# Patient Record
Sex: FEMALE | Race: WHITE | NOT HISPANIC OR LATINO | Employment: OTHER | URBAN - METROPOLITAN AREA
[De-identification: names, ages, dates, MRNs, and addresses within clinical notes are randomized per-mention and may not be internally consistent; named-entity substitution may affect disease eponyms.]

---

## 2020-12-10 ENCOUNTER — OFFICE VISIT (OUTPATIENT)
Dept: PODIATRY | Facility: CLINIC | Age: 70
End: 2020-12-10
Payer: MEDICARE

## 2020-12-10 VITALS
BODY MASS INDEX: 31.89 KG/M2 | WEIGHT: 180 LBS | DIASTOLIC BLOOD PRESSURE: 85 MMHG | HEIGHT: 63 IN | SYSTOLIC BLOOD PRESSURE: 175 MMHG | HEART RATE: 70 BPM | TEMPERATURE: 98 F

## 2020-12-10 DIAGNOSIS — M76.61 ACHILLES TENDINITIS OF RIGHT LOWER EXTREMITY: Primary | ICD-10-CM

## 2020-12-10 DIAGNOSIS — M20.12 VALGUS DEFORMITY OF BOTH GREAT TOES: ICD-10-CM

## 2020-12-10 DIAGNOSIS — M20.41 HAMMER TOES OF BOTH FEET: ICD-10-CM

## 2020-12-10 DIAGNOSIS — M20.42 HAMMER TOES OF BOTH FEET: ICD-10-CM

## 2020-12-10 DIAGNOSIS — M20.11 VALGUS DEFORMITY OF BOTH GREAT TOES: ICD-10-CM

## 2020-12-10 PROCEDURE — 99999 PR PBB SHADOW E&M-NEW PATIENT-LVL III: ICD-10-PCS | Mod: PBBFAC,,, | Performed by: PODIATRIST

## 2020-12-10 PROCEDURE — 99203 OFFICE O/P NEW LOW 30 MIN: CPT | Mod: S$GLB,,, | Performed by: PODIATRIST

## 2020-12-10 PROCEDURE — 99999 PR PBB SHADOW E&M-NEW PATIENT-LVL III: CPT | Mod: PBBFAC,,, | Performed by: PODIATRIST

## 2020-12-10 PROCEDURE — 99203 OFFICE O/P NEW LOW 30 MIN: CPT | Mod: PBBFAC,PN | Performed by: PODIATRIST

## 2020-12-10 PROCEDURE — 99203 PR OFFICE/OUTPT VISIT, NEW, LEVL III, 30-44 MIN: ICD-10-PCS | Mod: S$GLB,,, | Performed by: PODIATRIST

## 2020-12-10 RX ORDER — ROPINIROLE 1 MG/1
1 TABLET, FILM COATED ORAL 3 TIMES DAILY
COMMUNITY
End: 2021-03-17 | Stop reason: SDUPTHER

## 2020-12-10 RX ORDER — NAPROXEN SODIUM 220 MG
220 TABLET ORAL
COMMUNITY

## 2020-12-10 RX ORDER — GLUCOSAMINE HCL 500 MG
1000 TABLET ORAL
COMMUNITY

## 2020-12-10 RX ORDER — CITALOPRAM 40 MG/1
40 TABLET, FILM COATED ORAL
COMMUNITY

## 2020-12-10 RX ORDER — LEVOTHYROXINE SODIUM 100 UG/1
100 TABLET ORAL
COMMUNITY
End: 2021-03-17 | Stop reason: SDUPTHER

## 2020-12-10 RX ORDER — MELOXICAM 15 MG/1
15 TABLET ORAL DAILY
COMMUNITY
End: 2021-03-17 | Stop reason: SDUPTHER

## 2020-12-10 RX ORDER — GABAPENTIN 400 MG/1
400 CAPSULE ORAL
COMMUNITY

## 2020-12-10 NOTE — LETTER
December 13, 2020      Marcelina Weber, ROYA  [address] [facility] [place] [address]           [facility] - Podiatry/Wound Care  [facility] [facility] [address]  Phone: [phone]  Fax: [phone]          Patient: Tawny Olguin   MR Number: [id number]   YOB: 1950   Date of Visit: 12/10/2020       Dear Marcelina Weber:    Thank you for referring Tawny Olguin to me for evaluation. Attached you will find relevant portions of my assessment and plan of care.    If you have questions, please do not hesitate to call me. I look forward to following Tawny Olguin along with you.    Sincerely,    Yinka Shin, MIGUELITO    Enclosure  CC:  No Recipients    If you would like to receive this communication electronically, please contact [email or web address] or [phone] to request more information on [email or web address] Link access.    For providers and/or their staff who would like to refer a patient to [facility], please contact us through our one-stop-shop provider referral line, [facility], at [phone].    If you feel you have received this communication in error or would no longer like to receive these types of communications, please e-mail [email or web address]

## 2020-12-13 NOTE — PROGRESS NOTES
Subjective:       Patient ID: Tawny Olguin is a 70 y.o. female.    Chief Complaint: Follow-up, Foot Pain, Foot Problem, and Callouses   Patient presents today with multiple complaints she is complaining of bunions on both feet as well as hammertoes and mom areas of callus formation she states that she has lost about 80 lb in since she has lost weight her feet have actually started to bother her more her right foot is more bothersome than the left she is also having a stabbing type pain over the bunion and she is having pain at the back of her right heel more so than the left.  Patient states this has gotten worse over the past 6-7 months.    Past Medical History:   Diagnosis Date    Arthritis     Hypertension     Hypothyroidism      Past Surgical History:   Procedure Laterality Date    gastric sleeve      2019    HYSTERECTOMY      STOMACH SURGERY      STOMACH SURGERY      TOTAL KNEE REPLACEMENT USING COMPUTER NAVIGATION      right      Family History   Problem Relation Age of Onset    Diabetes Mother     Diabetes Sister     Diabetes Brother     Diabetes Daughter      Social History     Socioeconomic History    Marital status:      Spouse name: Not on file    Number of children: Not on file    Years of education: Not on file    Highest education level: Not on file   Occupational History    Not on file   Social Needs    Financial resource strain: Not on file    Food insecurity     Worry: Not on file     Inability: Not on file    Transportation needs     Medical: Not on file     Non-medical: Not on file   Tobacco Use    Smoking status: Former Smoker     Types: Cigarettes    Smokeless tobacco: Never Used   Substance and Sexual Activity    Alcohol use: Not Currently    Drug use: Never    Sexual activity: Not Currently   Lifestyle    Physical activity     Days per week: Not on file     Minutes per session: Not on file    Stress: Not on file   Relationships    Social connections     Talks  "on phone: Not on file     Gets together: Not on file     Attends Gnosticist service: Not on file     Active member of club or organization: Not on file     Attends meetings of clubs or organizations: Not on file     Relationship status: Not on file   Other Topics Concern    Not on file   Social History Narrative    Not on file       Current Outpatient Medications   Medication Sig Dispense Refill    cholecalciferol, vitamin D3, 75 mcg (3,000 unit) Tab Take 1,000 Units by mouth.      citalopram (CELEXA) 40 MG tablet Take 40 mg by mouth.      gabapentin (NEURONTIN) 400 MG capsule Take 400 mg by mouth.      levothyroxine (SYNTHROID) 100 MCG tablet Take 100 mcg by mouth.      meloxicam (MOBIC) 15 MG tablet Take 15 mg by mouth once daily.      naproxen sodium (ANAPROX) 220 MG tablet Take 220 mg by mouth.      rOPINIRole (REQUIP) 1 MG tablet Take 1 mg by mouth 3 (three) times daily.       No current facility-administered medications for this visit.      Review of patient's allergies indicates:   Allergen Reactions    Vicodin [hydrocodone-acetaminophen]        Review of Systems   Musculoskeletal: Positive for arthralgias and joint swelling.   All other systems reviewed and are negative.      Objective:      Vitals:    12/10/20 1025   BP: (!) 175/85   Pulse: 70   Temp: 98 °F (36.7 °C)   Weight: 81.6 kg (180 lb)   Height: 5' 3" (1.6 m)     Physical Exam  Vitals signs and nursing note reviewed.   Constitutional:       Appearance: Normal appearance.   Cardiovascular:      Pulses:           Dorsalis pedis pulses are 2+ on the right side and 2+ on the left side.        Posterior tibial pulses are 2+ on the right side and 2+ on the left side.   Pulmonary:      Effort: Pulmonary effort is normal.   Musculoskeletal:         General: Swelling, tenderness and deformity present.      Right foot: Decreased range of motion. Deformity and bunion present.      Left foot: Decreased range of motion. Deformity and bunion present.     "    Feet:    Feet:      Right foot:      Protective Sensation: 2 sites tested. 2 sites sensed.      Skin integrity: Erythema and warmth present.      Left foot:      Protective Sensation: 2 sites tested. 2 sites sensed.      Skin integrity: Erythema and warmth present.   Skin:     General: Skin is warm.      Capillary Refill: Capillary refill takes less than 2 seconds.      Findings: Erythema present.   Neurological:      General: No focal deficit present.      Mental Status: She is alert.   Psychiatric:         Mood and Affect: Mood normal.         Behavior: Behavior normal.         Thought Content: Thought content normal.         Judgment: Judgment normal.              Assessment:       1. Achilles tendinitis of right lower extremity    2. Valgus deformity of both great toes    3. Hammer toes of both feet        Plan:        Patient presents today with multiple complaints she is complaining of bunions on both feet as well as hammertoes and mom areas of callus formation she states that she has lost about 80 lb in since she has lost weight her feet have actually started to bother her more her right foot is more bothersome than the left she is also having a stabbing type pain over the bunion and she is having pain at the back of her right heel more so than the left.  Patient states this has gotten worse over the past 6-7 months.  Following extensive evaluation patient has significant bunion deformities bilateral right greater than left she also has severely contracted digits consistent with hammertoe deformity bilateral.  Patient has palpable spurring at the posterior aspect of the calcaneus and Achilles tendon insertion bilateral the right is definitely worse than the left I have ordered plain film x-rays bilateral to evaluate the bunion hammertoe deformities as well as the spurring of the calcaneus patient is going to have these done prior to her next visit in 1 week as of the time of dictation the x-rays had not  been done yet so I will discuss these in review these with her at her follow-up appointment her left leg is slightly shorter than the right she does wear little bit of a heel lift in the left side I did dispense soft blue heel lifts for the patient to try to take some of the pressure off of the Achilles tendon insertion bilateral and the spurring this is to try to get her some relief until I follow up with her in review her x-rays and provide surgical consultation.  Follow-up 1 week face-to-face time equaled 30 min.This note was created using [employer] voice recognition software that occasionally misinterpreted phrases or words.

## 2020-12-15 ENCOUNTER — HOSPITAL ENCOUNTER (OUTPATIENT)
Dept: RADIOLOGY | Facility: HOSPITAL | Age: 70
Discharge: HOME | End: 2020-12-15
Attending: PODIATRIST
Payer: MEDICARE

## 2020-12-15 DIAGNOSIS — M20.12 VALGUS DEFORMITY OF BOTH GREAT TOES: ICD-10-CM

## 2020-12-15 DIAGNOSIS — M20.41 HAMMER TOES OF BOTH FEET: ICD-10-CM

## 2020-12-15 DIAGNOSIS — M20.11 VALGUS DEFORMITY OF BOTH GREAT TOES: ICD-10-CM

## 2020-12-15 DIAGNOSIS — M20.42 HAMMER TOES OF BOTH FEET: ICD-10-CM

## 2020-12-15 PROCEDURE — 73630 XR FOOT COMPLETE 3 VIEW BILATERAL: ICD-10-PCS | Mod: 26,50,, | Performed by: RADIOLOGY

## 2020-12-15 PROCEDURE — 73630 X-RAY EXAM OF FOOT: CPT | Mod: TC,50,PN

## 2020-12-15 PROCEDURE — 73630 X-RAY EXAM OF FOOT: CPT | Mod: 26,50,, | Performed by: RADIOLOGY

## 2020-12-17 ENCOUNTER — OFFICE VISIT (OUTPATIENT)
Dept: PODIATRY | Facility: CLINIC | Age: 70
End: 2020-12-17
Payer: MEDICAID

## 2020-12-17 VITALS
SYSTOLIC BLOOD PRESSURE: 146 MMHG | DIASTOLIC BLOOD PRESSURE: 78 MMHG | TEMPERATURE: 98 F | HEART RATE: 76 BPM | HEIGHT: 63 IN | BODY MASS INDEX: 31.89 KG/M2 | WEIGHT: 180 LBS

## 2020-12-17 DIAGNOSIS — M77.31 CALCANEAL SPUR OF RIGHT FOOT: ICD-10-CM

## 2020-12-17 DIAGNOSIS — S86.011D RUPTURE OF RIGHT ACHILLES TENDON, SUBSEQUENT ENCOUNTER: Primary | ICD-10-CM

## 2020-12-17 DIAGNOSIS — M20.11 HALLUX VALGUS OF RIGHT FOOT: ICD-10-CM

## 2020-12-17 DIAGNOSIS — M20.41 HAMMER TOE OF RIGHT FOOT: ICD-10-CM

## 2020-12-17 PROBLEM — S86.011A ACHILLES RUPTURE, RIGHT: Status: ACTIVE | Noted: 2020-12-17

## 2020-12-17 PROCEDURE — 99215 OFFICE O/P EST HI 40 MIN: CPT | Mod: S$GLB,,, | Performed by: PODIATRIST

## 2020-12-17 PROCEDURE — 99999 PR PBB SHADOW E&M-EST. PATIENT-LVL IV: ICD-10-PCS | Mod: PBBFAC,,, | Performed by: PODIATRIST

## 2020-12-17 PROCEDURE — 99214 OFFICE O/P EST MOD 30 MIN: CPT | Mod: PBBFAC,PN | Performed by: PODIATRIST

## 2020-12-17 PROCEDURE — 99215 PR OFFICE/OUTPT VISIT, EST, LEVL V, 40-54 MIN: ICD-10-PCS | Mod: S$GLB,,, | Performed by: PODIATRIST

## 2020-12-17 PROCEDURE — 99999 PR PBB SHADOW E&M-EST. PATIENT-LVL IV: CPT | Mod: PBBFAC,,, | Performed by: PODIATRIST

## 2020-12-17 RX ORDER — ESCITALOPRAM OXALATE 20 MG/1
TABLET ORAL
COMMUNITY
Start: 2020-12-14

## 2020-12-17 RX ORDER — MONTELUKAST SODIUM 10 MG/1
TABLET ORAL
COMMUNITY
Start: 2020-12-14 | End: 2021-03-17 | Stop reason: SDUPTHER

## 2020-12-20 NOTE — PROGRESS NOTES
Subjective:       Patient ID: Shyanne Olguin is a 70 y.o. female.    Chief Complaint: Follow-up, Foot Pain, Heel Pain, and Foot Problem   Patient presents today with multiple complaints she is complaining of bunions on both feet as well as hammertoes and multiple areas of callus formation she states that she has lost about 80 lb in since she has lost weight her feet have actually started to bother her more her right foot is more bothersome than the left she is also having a stabbing type pain over the bunion and she is having pain at the back of her right heel more so than the left.  Patient states this has gotten worse over the past 6-7 months.    Past Medical History:   Diagnosis Date    Arthritis     Hypertension     Hypothyroidism      Past Surgical History:   Procedure Laterality Date    gastric sleeve      2019    HYSTERECTOMY      STOMACH SURGERY      STOMACH SURGERY      TOTAL KNEE REPLACEMENT USING COMPUTER NAVIGATION      right      Family History   Problem Relation Age of Onset    Diabetes Mother     Diabetes Sister     Diabetes Brother     Diabetes Daughter      Social History     Socioeconomic History    Marital status:      Spouse name: Not on file    Number of children: Not on file    Years of education: Not on file    Highest education level: Not on file   Occupational History    Not on file   Social Needs    Financial resource strain: Not on file    Food insecurity     Worry: Not on file     Inability: Not on file    Transportation needs     Medical: Not on file     Non-medical: Not on file   Tobacco Use    Smoking status: Former Smoker     Types: Cigarettes    Smokeless tobacco: Never Used   Substance and Sexual Activity    Alcohol use: Not Currently    Drug use: Never    Sexual activity: Not Currently   Lifestyle    Physical activity     Days per week: Not on file     Minutes per session: Not on file    Stress: Not on file   Relationships    Social connections      "Talks on phone: Not on file     Gets together: Not on file     Attends Confucianist service: Not on file     Active member of club or organization: Not on file     Attends meetings of clubs or organizations: Not on file     Relationship status: Not on file   Other Topics Concern    Not on file   Social History Narrative    Not on file       Current Outpatient Medications   Medication Sig Dispense Refill    cholecalciferol, vitamin D3, 75 mcg (3,000 unit) Tab Take 1,000 Units by mouth.      citalopram (CELEXA) 40 MG tablet Take 40 mg by mouth.      escitalopram oxalate (LEXAPRO) 20 MG tablet       gabapentin (NEURONTIN) 400 MG capsule Take 400 mg by mouth.      levothyroxine (SYNTHROID) 100 MCG tablet Take 100 mcg by mouth.      meloxicam (MOBIC) 15 MG tablet Take 15 mg by mouth once daily.      montelukast (SINGULAIR) 10 mg tablet       naproxen sodium (ANAPROX) 220 MG tablet Take 220 mg by mouth.      rOPINIRole (REQUIP) 1 MG tablet Take 1 mg by mouth 3 (three) times daily.       No current facility-administered medications for this visit.      Review of patient's allergies indicates:   Allergen Reactions    Hydrocodone-acetaminophen      "causes severe Headaches"       Review of Systems   Musculoskeletal: Positive for arthralgias and joint swelling.   All other systems reviewed and are negative.      Objective:      Vitals:    12/17/20 1310   BP: (!) 146/78   Pulse: 76   Temp: 98.1 °F (36.7 °C)   Weight: 81.6 kg (180 lb)   Height: 5' 3" (1.6 m)     Physical Exam  Vitals signs and nursing note reviewed.   Constitutional:       Appearance: Normal appearance.   Cardiovascular:      Pulses:           Dorsalis pedis pulses are 2+ on the right side and 2+ on the left side.        Posterior tibial pulses are 2+ on the right side and 2+ on the left side.   Pulmonary:      Effort: Pulmonary effort is normal.   Musculoskeletal:         General: Swelling, tenderness and deformity present.      Right foot: Decreased " range of motion. Deformity and bunion present.      Left foot: Decreased range of motion. Deformity and bunion present.        Feet:    Feet:      Right foot:      Protective Sensation: 2 sites tested. 2 sites sensed.      Skin integrity: Erythema and warmth present.      Left foot:      Protective Sensation: 2 sites tested. 2 sites sensed.      Skin integrity: Erythema and warmth present.   Skin:     General: Skin is warm.      Capillary Refill: Capillary refill takes less than 2 seconds.      Findings: Erythema present.   Neurological:      General: No focal deficit present.      Mental Status: She is alert.   Psychiatric:         Mood and Affect: Mood normal.         Behavior: Behavior normal.         Thought Content: Thought content normal.         Judgment: Judgment normal.              Assessment:       1. Rupture of right Achilles tendon, subsequent encounter    2. Calcaneal spur of right foot    3. Hallux valgus of right foot    4. Hammer toe of right foot        Plan:        Patient presents today with multiple complaints she is complaining of bunions on both feet as well as hammertoes and multiple areas of callus formation she states that she has lost about 80 lb in since she has lost weight her feet have actually started to bother her more her right foot is more bothersome than the left she is also having a stabbing type pain over the bunion and she is having pain at the back of her right heel more so than the left.  Patient states this has gotten worse over the past 6-7 months.  Patient's x-rays were evaluated and discussed at length and in detail with her today patient has extensive spurring at the posterior calcaneus right at the attachment site of the Achilles tendon there is likely some degree of Achilles tendon tearing at this site with this extensive spurring patient has a lot of pain inflammation discomfort in this area it does not appear that there is a complete rupture of the Achilles as it does  appear to still be attached but has to be assumed that there is some rupturing of the Achilles tendon do the extensive spurring as display to the patient on x-ray patient does not have this posterior calcaneal spurring on the left foot.  I also reviewed and discussed the x-rays for the patient's bunion deformities bilateral patient has a considerable bunion deformity bilateral right greater than left patient indicates she would like to proceed with surgery to address the bunion deformity it has become very difficult to wear any type of closed in shoe and she is having almost constant pain that is gotten progressively worse over this area.  Additionally the patient has a significant digital contracture of the 2nd digit I would recommend correcting this also I discussed a closing base wedge osteotomy with distal Akin procedure as needed to address the bunion deformity an arthrodesis of the 2nd digit right patient had inquired about possibly fixing her Achilles tendon at the same time I did advised her this can be done it would entail her being nonweightbearing for a period of 6 weeks following surgery for the Achilles tendon to properly heal but we can fix the Achilles tendon remove the spurs correct the bunion and correct the contracted 2nd digit on the right foot all at the same time so that she will be healing from all of these procedures at the same time.  Patient indicated she would like to go ahead and pursue this surgery as discussed today we have tentatively schedule her for the middle of January however the patient does need to get cardiac clearance from her cardiologist Dr. Byrd 1st.  Patient was given a prescription pad indicating that she needs cardiac clearance to take to Dr. Byrd.  Patient states she has recently had a cardiac workup including a stress test but I have advised her doctor lip these may want to see her before he clears her for surgery she was in understanding and agreement with this today  I will see her for further preoperative evaluation and surgical consultation.  Total face-to-face time including discussion evaluation discussion of treatment plan surgery involved length of recovery and review of x-rays equaled 60 min.  This note was created using [employer] voice recognition software that occasionally misinterpreted phrases or words.

## 2021-01-11 ENCOUNTER — OFFICE VISIT (OUTPATIENT)
Dept: PODIATRY | Facility: CLINIC | Age: 71
End: 2021-01-11
Payer: MEDICARE

## 2021-01-11 VITALS
HEIGHT: 63 IN | WEIGHT: 180 LBS | SYSTOLIC BLOOD PRESSURE: 144 MMHG | HEART RATE: 64 BPM | DIASTOLIC BLOOD PRESSURE: 69 MMHG | TEMPERATURE: 98 F | OXYGEN SATURATION: 98 % | BODY MASS INDEX: 31.89 KG/M2

## 2021-01-11 DIAGNOSIS — Z01.818 PRE-OP TESTING: ICD-10-CM

## 2021-01-11 DIAGNOSIS — M77.31 CALCANEAL SPUR OF RIGHT FOOT: ICD-10-CM

## 2021-01-11 DIAGNOSIS — M20.41 HAMMER TOE OF RIGHT FOOT: ICD-10-CM

## 2021-01-11 DIAGNOSIS — Z01.818 PRE-OP EXAM: ICD-10-CM

## 2021-01-11 DIAGNOSIS — M20.11 HALLUX VALGUS OF RIGHT FOOT: ICD-10-CM

## 2021-01-11 DIAGNOSIS — S86.011D RUPTURE OF RIGHT ACHILLES TENDON, SUBSEQUENT ENCOUNTER: Primary | ICD-10-CM

## 2021-01-11 PROCEDURE — 99999 PR PBB SHADOW E&M-EST. PATIENT-LVL V: CPT | Mod: PBBFAC,,, | Performed by: PODIATRIST

## 2021-01-11 PROCEDURE — 99999 PR PBB SHADOW E&M-EST. PATIENT-LVL V: ICD-10-PCS | Mod: PBBFAC,,, | Performed by: PODIATRIST

## 2021-01-11 PROCEDURE — 99215 OFFICE O/P EST HI 40 MIN: CPT | Mod: S$GLB,,, | Performed by: PODIATRIST

## 2021-01-11 PROCEDURE — 99215 PR OFFICE/OUTPT VISIT, EST, LEVL V, 40-54 MIN: ICD-10-PCS | Mod: S$GLB,,, | Performed by: PODIATRIST

## 2021-01-11 RX ORDER — SULFAMETHOXAZOLE AND TRIMETHOPRIM 400; 80 MG/1; MG/1
2 TABLET ORAL 2 TIMES DAILY
Qty: 56 TABLET | Refills: 0 | Status: SHIPPED | OUTPATIENT
Start: 2021-01-11 | End: 2021-01-25

## 2021-01-11 RX ORDER — ONDANSETRON HYDROCHLORIDE 8 MG/1
8 TABLET, FILM COATED ORAL EVERY 8 HOURS PRN
Qty: 42 TABLET | Refills: 1 | Status: SHIPPED | OUTPATIENT
Start: 2021-01-11 | End: 2021-01-25

## 2021-01-11 RX ORDER — OXYCODONE AND ACETAMINOPHEN 10; 325 MG/1; MG/1
1 TABLET ORAL
Qty: 30 TABLET | Refills: 0 | Status: SHIPPED | OUTPATIENT
Start: 2021-01-11 | End: 2021-01-16

## 2021-01-12 ENCOUNTER — HOSPITAL ENCOUNTER (OUTPATIENT)
Dept: PREADMISSION TESTING | Facility: HOSPITAL | Age: 71
Discharge: HOME | End: 2021-01-12
Attending: PODIATRIST
Payer: MEDICARE

## 2021-01-12 ENCOUNTER — ANESTHESIA EVENT (OUTPATIENT)
Dept: SURGERY | Facility: HOSPITAL | Age: 71
End: 2021-01-12
Payer: MEDICARE

## 2021-01-12 VITALS — HEIGHT: 63 IN | WEIGHT: 180 LBS | BODY MASS INDEX: 31.89 KG/M2

## 2021-01-12 DIAGNOSIS — S86.011D RUPTURE OF RIGHT ACHILLES TENDON, SUBSEQUENT ENCOUNTER: Primary | ICD-10-CM

## 2021-01-12 PROCEDURE — 93010 ELECTROCARDIOGRAM REPORT: CPT | Mod: ,,, | Performed by: INTERNAL MEDICINE

## 2021-01-12 PROCEDURE — 93005 ELECTROCARDIOGRAM TRACING: CPT

## 2021-01-12 PROCEDURE — U0003 INFECTIOUS AGENT DETECTION BY NUCLEIC ACID (DNA OR RNA); SEVERE ACUTE RESPIRATORY SYNDROME CORONAVIRUS 2 (SARS-COV-2) (CORONAVIRUS DISEASE [COVID-19]), AMPLIFIED PROBE TECHNIQUE, MAKING USE OF HIGH THROUGHPUT TECHNOLOGIES AS DESCRIBED BY CMS-2020-01-R: HCPCS

## 2021-01-12 PROCEDURE — 99900103 DSU ONLY-NO CHARGE-INITIAL HR (STAT)

## 2021-01-12 PROCEDURE — 93010 EKG 12-LEAD: ICD-10-PCS | Mod: ,,, | Performed by: INTERNAL MEDICINE

## 2021-01-12 RX ORDER — MORPHINE SULFATE 4 MG/ML
2 INJECTION, SOLUTION INTRAMUSCULAR; INTRAVENOUS EVERY 5 MIN PRN
Status: CANCELLED | OUTPATIENT
Start: 2021-01-12

## 2021-01-12 RX ORDER — DIPHENHYDRAMINE HYDROCHLORIDE 50 MG/ML
12.5 INJECTION INTRAMUSCULAR; INTRAVENOUS
Status: CANCELLED | OUTPATIENT
Start: 2021-01-12

## 2021-01-12 RX ORDER — SODIUM CHLORIDE, SODIUM LACTATE, POTASSIUM CHLORIDE, CALCIUM CHLORIDE 600; 310; 30; 20 MG/100ML; MG/100ML; MG/100ML; MG/100ML
125 INJECTION, SOLUTION INTRAVENOUS CONTINUOUS
Status: CANCELLED | OUTPATIENT
Start: 2021-01-12

## 2021-01-12 RX ORDER — ONDANSETRON 2 MG/ML
4 INJECTION INTRAMUSCULAR; INTRAVENOUS DAILY PRN
Status: CANCELLED | OUTPATIENT
Start: 2021-01-12

## 2021-01-13 LAB — SARS-COV-2 RNA RESP QL NAA+PROBE: NOT DETECTED

## 2021-01-13 RX ORDER — SODIUM CHLORIDE, SODIUM LACTATE, POTASSIUM CHLORIDE, CALCIUM CHLORIDE 600; 310; 30; 20 MG/100ML; MG/100ML; MG/100ML; MG/100ML
INJECTION, SOLUTION INTRAVENOUS CONTINUOUS
Status: CANCELLED | OUTPATIENT
Start: 2021-01-15

## 2021-01-15 ENCOUNTER — ANESTHESIA (OUTPATIENT)
Dept: SURGERY | Facility: HOSPITAL | Age: 71
End: 2021-01-15
Payer: MEDICARE

## 2021-01-15 ENCOUNTER — HOSPITAL ENCOUNTER (OUTPATIENT)
Facility: HOSPITAL | Age: 71
Discharge: HOME | End: 2021-01-15
Attending: PODIATRIST | Admitting: PODIATRIST
Payer: MEDICARE

## 2021-01-15 VITALS
WEIGHT: 180 LBS | HEART RATE: 86 BPM | TEMPERATURE: 98 F | BODY MASS INDEX: 31.89 KG/M2 | HEIGHT: 63 IN | OXYGEN SATURATION: 97 % | RESPIRATION RATE: 18 BRPM | SYSTOLIC BLOOD PRESSURE: 124 MMHG | DIASTOLIC BLOOD PRESSURE: 67 MMHG

## 2021-01-15 DIAGNOSIS — M20.11 HALLUX VALGUS OF RIGHT FOOT: ICD-10-CM

## 2021-01-15 DIAGNOSIS — M20.41 HAMMER TOES OF BOTH FEET: ICD-10-CM

## 2021-01-15 DIAGNOSIS — S86.011D RUPTURE OF RIGHT ACHILLES TENDON, SUBSEQUENT ENCOUNTER: Primary | ICD-10-CM

## 2021-01-15 DIAGNOSIS — M77.31 CALCANEAL SPUR OF RIGHT FOOT: ICD-10-CM

## 2021-01-15 DIAGNOSIS — M20.41 HAMMER TOE OF RIGHT FOOT: ICD-10-CM

## 2021-01-15 DIAGNOSIS — Z01.818 PRE-OP EXAM: ICD-10-CM

## 2021-01-15 DIAGNOSIS — M20.42 HAMMER TOES OF BOTH FEET: ICD-10-CM

## 2021-01-15 PROCEDURE — C1769 GUIDE WIRE: HCPCS | Performed by: PODIATRIST

## 2021-01-15 PROCEDURE — C1713 ANCHOR/SCREW BN/BN,TIS/BN: HCPCS | Performed by: PODIATRIST

## 2021-01-15 PROCEDURE — 28120 PART REMOVAL OF ANKLE/HEEL: CPT | Mod: 51,RT,, | Performed by: PODIATRIST

## 2021-01-15 PROCEDURE — 63600175 PHARM REV CODE 636 W HCPCS: Performed by: PODIATRIST

## 2021-01-15 PROCEDURE — 28120 PR PART REMV TALUS OR CALCANEUS: ICD-10-PCS | Mod: 51,RT,, | Performed by: PODIATRIST

## 2021-01-15 PROCEDURE — 71000015 HC POSTOP RECOV 1ST HR: Performed by: PODIATRIST

## 2021-01-15 PROCEDURE — 27654 PR REPAIR ACHILLES TENDON,SECONDARY: ICD-10-PCS | Mod: 51,RT,, | Performed by: PODIATRIST

## 2021-01-15 PROCEDURE — D9220A PRA ANESTHESIA: Mod: ANES,,, | Performed by: ANESTHESIOLOGY

## 2021-01-15 PROCEDURE — 28299 PR CORRECT BUNION,DOUBLE OSTEOTOMY: ICD-10-PCS | Mod: T5,,, | Performed by: PODIATRIST

## 2021-01-15 PROCEDURE — 37000009 HC ANESTHESIA EA ADD 15 MINS: Performed by: PODIATRIST

## 2021-01-15 PROCEDURE — 01480 ANES OPEN PX LOWER L/A/F NOS: CPT | Performed by: PODIATRIST

## 2021-01-15 PROCEDURE — 63600175 PHARM REV CODE 636 W HCPCS: Performed by: NURSE ANESTHETIST, CERTIFIED REGISTERED

## 2021-01-15 PROCEDURE — D9220A PRA ANESTHESIA: ICD-10-PCS | Mod: CRNA,,, | Performed by: NURSE ANESTHETIST, CERTIFIED REGISTERED

## 2021-01-15 PROCEDURE — 28285 REPAIR OF HAMMERTOE: CPT | Mod: 59,T6,, | Performed by: PODIATRIST

## 2021-01-15 PROCEDURE — 71000033 HC RECOVERY, INTIAL HOUR: Performed by: PODIATRIST

## 2021-01-15 PROCEDURE — 63600175 PHARM REV CODE 636 W HCPCS: Performed by: ANESTHESIOLOGY

## 2021-01-15 PROCEDURE — 25000003 PHARM REV CODE 250

## 2021-01-15 PROCEDURE — 37000008 HC ANESTHESIA 1ST 15 MINUTES: Performed by: PODIATRIST

## 2021-01-15 PROCEDURE — 25000003 PHARM REV CODE 250: Performed by: PODIATRIST

## 2021-01-15 PROCEDURE — D9220A PRA ANESTHESIA: Mod: CRNA,,, | Performed by: NURSE ANESTHETIST, CERTIFIED REGISTERED

## 2021-01-15 PROCEDURE — 27654 REPAIR OF ACHILLES TENDON: CPT | Mod: 51,RT,, | Performed by: PODIATRIST

## 2021-01-15 PROCEDURE — D9220A PRA ANESTHESIA: ICD-10-PCS | Mod: ANES,,, | Performed by: ANESTHESIOLOGY

## 2021-01-15 PROCEDURE — 36000708 HC OR TIME LEV III 1ST 15 MIN: Performed by: PODIATRIST

## 2021-01-15 PROCEDURE — 27201423 OPTIME MED/SURG SUP & DEVICES STERILE SUPPLY: Performed by: PODIATRIST

## 2021-01-15 PROCEDURE — 25000003 PHARM REV CODE 250: Performed by: NURSE ANESTHETIST, CERTIFIED REGISTERED

## 2021-01-15 PROCEDURE — 28299 COR HLX VLGS DOUBLE OSTEOT: CPT | Mod: T5,,, | Performed by: PODIATRIST

## 2021-01-15 PROCEDURE — 36000709 HC OR TIME LEV III EA ADD 15 MIN: Performed by: PODIATRIST

## 2021-01-15 PROCEDURE — 28285 PR REPAIR OF HAMMERTOE,ONE: ICD-10-PCS | Mod: 59,T6,, | Performed by: PODIATRIST

## 2021-01-15 DEVICE — IMPLANTABLE DEVICE: Type: IMPLANTABLE DEVICE | Site: FOOT | Status: FUNCTIONAL

## 2021-01-15 DEVICE — IMPLANT SYS, BIOC ACHILLES SPEEDB W/JUMP
Type: IMPLANTABLE DEVICE | Site: FOOT | Status: FUNCTIONAL
Brand: ARTHREX®

## 2021-01-15 RX ORDER — CEFAZOLIN SODIUM 2 G/50ML
SOLUTION INTRAVENOUS
Status: COMPLETED
Start: 2021-01-15 | End: 2021-01-15

## 2021-01-15 RX ORDER — SODIUM CHLORIDE, SODIUM LACTATE, POTASSIUM CHLORIDE, CALCIUM CHLORIDE 600; 310; 30; 20 MG/100ML; MG/100ML; MG/100ML; MG/100ML
INJECTION, SOLUTION INTRAVENOUS CONTINUOUS
Status: DISCONTINUED | OUTPATIENT
Start: 2021-01-15 | End: 2021-01-15 | Stop reason: HOSPADM

## 2021-01-15 RX ORDER — LIDOCAINE HYDROCHLORIDE 10 MG/ML
1 INJECTION, SOLUTION EPIDURAL; INFILTRATION; INTRACAUDAL; PERINEURAL ONCE
Status: DISCONTINUED | OUTPATIENT
Start: 2021-01-15 | End: 2021-01-15 | Stop reason: HOSPADM

## 2021-01-15 RX ORDER — BUPIVACAINE HYDROCHLORIDE 5 MG/ML
INJECTION, SOLUTION PERINEURAL
Status: DISCONTINUED | OUTPATIENT
Start: 2021-01-15 | End: 2021-01-15 | Stop reason: HOSPADM

## 2021-01-15 RX ORDER — ROCURONIUM BROMIDE 10 MG/ML
INJECTION, SOLUTION INTRAVENOUS
Status: DISCONTINUED | OUTPATIENT
Start: 2021-01-15 | End: 2021-01-15

## 2021-01-15 RX ORDER — CEFAZOLIN SODIUM 2 G/50ML
2 SOLUTION INTRAVENOUS
Status: COMPLETED | OUTPATIENT
Start: 2021-01-15 | End: 2021-01-15

## 2021-01-15 RX ORDER — LIDOCAINE HYDROCHLORIDE 10 MG/ML
INJECTION INFILTRATION; PERINEURAL
Status: DISCONTINUED | OUTPATIENT
Start: 2021-01-15 | End: 2021-01-15 | Stop reason: HOSPADM

## 2021-01-15 RX ORDER — PROPOFOL 10 MG/ML
VIAL (ML) INTRAVENOUS
Status: DISCONTINUED | OUTPATIENT
Start: 2021-01-15 | End: 2021-01-15

## 2021-01-15 RX ORDER — FAMOTIDINE 10 MG/ML
INJECTION INTRAVENOUS
Status: COMPLETED
Start: 2021-01-15 | End: 2021-01-15

## 2021-01-15 RX ORDER — SUCCINYLCHOLINE CHLORIDE 20 MG/ML
INJECTION INTRAMUSCULAR; INTRAVENOUS
Status: DISCONTINUED | OUTPATIENT
Start: 2021-01-15 | End: 2021-01-15

## 2021-01-15 RX ORDER — MIDAZOLAM HYDROCHLORIDE 1 MG/ML
INJECTION INTRAMUSCULAR; INTRAVENOUS
Status: DISCONTINUED | OUTPATIENT
Start: 2021-01-15 | End: 2021-01-15

## 2021-01-15 RX ORDER — FAMOTIDINE 10 MG/ML
20 INJECTION INTRAVENOUS ONCE
Status: COMPLETED | OUTPATIENT
Start: 2021-01-15 | End: 2021-01-15

## 2021-01-15 RX ORDER — EPHEDRINE SULFATE 50 MG/ML
INJECTION, SOLUTION INTRAVENOUS
Status: DISCONTINUED | OUTPATIENT
Start: 2021-01-15 | End: 2021-01-15

## 2021-01-15 RX ORDER — BUPIVACAINE HYDROCHLORIDE 5 MG/ML
INJECTION, SOLUTION EPIDURAL; INTRACAUDAL
Status: DISCONTINUED
Start: 2021-01-15 | End: 2021-01-15 | Stop reason: HOSPADM

## 2021-01-15 RX ORDER — LIDOCAINE HYDROCHLORIDE 20 MG/ML
INJECTION, SOLUTION EPIDURAL; INFILTRATION; INTRACAUDAL; PERINEURAL
Status: DISCONTINUED | OUTPATIENT
Start: 2021-01-15 | End: 2021-01-15

## 2021-01-15 RX ORDER — ONDANSETRON 2 MG/ML
INJECTION INTRAMUSCULAR; INTRAVENOUS
Status: DISCONTINUED | OUTPATIENT
Start: 2021-01-15 | End: 2021-01-15

## 2021-01-15 RX ORDER — MEPERIDINE HYDROCHLORIDE 50 MG/ML
INJECTION INTRAMUSCULAR; INTRAVENOUS; SUBCUTANEOUS
Status: DISCONTINUED | OUTPATIENT
Start: 2021-01-15 | End: 2021-01-15

## 2021-01-15 RX ADMIN — EPHEDRINE SULFATE 15 MG: 50 INJECTION INTRAVENOUS at 10:01

## 2021-01-15 RX ADMIN — ONDANSETRON 4 MG: 2 INJECTION INTRAMUSCULAR; INTRAVENOUS at 07:01

## 2021-01-15 RX ADMIN — SUGAMMADEX 200 MG: 100 INJECTION, SOLUTION INTRAVENOUS at 10:01

## 2021-01-15 RX ADMIN — SODIUM CHLORIDE, POTASSIUM CHLORIDE, SODIUM LACTATE AND CALCIUM CHLORIDE: 600; 310; 30; 20 INJECTION, SOLUTION INTRAVENOUS at 07:01

## 2021-01-15 RX ADMIN — MEPERIDINE HYDROCHLORIDE 50 MG: 50 INJECTION INTRAMUSCULAR; INTRAVENOUS; SUBCUTANEOUS at 07:01

## 2021-01-15 RX ADMIN — SUCCINYLCHOLINE CHLORIDE 150 MG: 20 INJECTION, SOLUTION INTRAMUSCULAR; INTRAVENOUS at 07:01

## 2021-01-15 RX ADMIN — MIDAZOLAM HYDROCHLORIDE 2 MG: 1 INJECTION, SOLUTION INTRAMUSCULAR; INTRAVENOUS at 07:01

## 2021-01-15 RX ADMIN — ROCURONIUM BROMIDE 25 MG: 10 INJECTION, SOLUTION INTRAVENOUS at 07:01

## 2021-01-15 RX ADMIN — ROCURONIUM BROMIDE 25 MG: 10 INJECTION, SOLUTION INTRAVENOUS at 09:01

## 2021-01-15 RX ADMIN — SODIUM CHLORIDE, POTASSIUM CHLORIDE, SODIUM LACTATE AND CALCIUM CHLORIDE: 600; 310; 30; 20 INJECTION, SOLUTION INTRAVENOUS at 09:01

## 2021-01-15 RX ADMIN — ROCURONIUM BROMIDE 25 MG: 10 INJECTION, SOLUTION INTRAVENOUS at 08:01

## 2021-01-15 RX ADMIN — PROPOFOL 200 MG: 10 INJECTION, EMULSION INTRAVENOUS at 07:01

## 2021-01-15 RX ADMIN — CEFAZOLIN SODIUM 2000 MG: 2 SOLUTION INTRAVENOUS at 07:01

## 2021-01-15 RX ADMIN — EPHEDRINE SULFATE 10 MG: 50 INJECTION INTRAVENOUS at 09:01

## 2021-01-15 RX ADMIN — LIDOCAINE HYDROCHLORIDE 50 MG: 20 INJECTION, SOLUTION EPIDURAL; INFILTRATION; INTRACAUDAL; PERINEURAL at 07:01

## 2021-01-15 RX ADMIN — FAMOTIDINE 20 MG: 10 INJECTION INTRAVENOUS at 07:01

## 2021-01-15 RX ADMIN — EPHEDRINE SULFATE 25 MG: 50 INJECTION INTRAVENOUS at 07:01

## 2021-01-20 ENCOUNTER — OFFICE VISIT (OUTPATIENT)
Dept: PODIATRY | Facility: CLINIC | Age: 71
End: 2021-01-20
Payer: MEDICARE

## 2021-01-20 VITALS
TEMPERATURE: 98 F | RESPIRATION RATE: 15 BRPM | SYSTOLIC BLOOD PRESSURE: 118 MMHG | HEART RATE: 79 BPM | DIASTOLIC BLOOD PRESSURE: 70 MMHG | HEIGHT: 63 IN | OXYGEN SATURATION: 99 % | BODY MASS INDEX: 31.89 KG/M2 | WEIGHT: 180 LBS

## 2021-01-20 DIAGNOSIS — M20.11 HALLUX VALGUS OF RIGHT FOOT: Primary | ICD-10-CM

## 2021-01-20 DIAGNOSIS — M20.41 HAMMER TOE OF RIGHT FOOT: ICD-10-CM

## 2021-01-20 PROCEDURE — 99024 PR POST-OP FOLLOW-UP VISIT: ICD-10-PCS | Mod: S$GLB,,, | Performed by: PODIATRIST

## 2021-01-20 PROCEDURE — 99999 PR PBB SHADOW E&M-EST. PATIENT-LVL IV: ICD-10-PCS | Mod: PBBFAC,,, | Performed by: PODIATRIST

## 2021-01-20 PROCEDURE — 99999 PR PBB SHADOW E&M-EST. PATIENT-LVL IV: CPT | Mod: PBBFAC,,, | Performed by: PODIATRIST

## 2021-01-20 PROCEDURE — 99024 POSTOP FOLLOW-UP VISIT: CPT | Mod: S$GLB,,, | Performed by: PODIATRIST

## 2021-02-03 ENCOUNTER — HOSPITAL ENCOUNTER (OUTPATIENT)
Dept: RADIOLOGY | Facility: HOSPITAL | Age: 71
Discharge: HOME | End: 2021-02-03
Attending: PODIATRIST
Payer: MEDICARE

## 2021-02-03 ENCOUNTER — OFFICE VISIT (OUTPATIENT)
Dept: PODIATRY | Facility: CLINIC | Age: 71
End: 2021-02-03
Payer: MEDICARE

## 2021-02-03 VITALS
HEART RATE: 77 BPM | TEMPERATURE: 98 F | DIASTOLIC BLOOD PRESSURE: 67 MMHG | HEIGHT: 63 IN | WEIGHT: 180 LBS | BODY MASS INDEX: 31.89 KG/M2 | SYSTOLIC BLOOD PRESSURE: 96 MMHG

## 2021-02-03 DIAGNOSIS — S86.011D RUPTURE OF RIGHT ACHILLES TENDON, SUBSEQUENT ENCOUNTER: ICD-10-CM

## 2021-02-03 DIAGNOSIS — M20.41 HAMMER TOE OF RIGHT FOOT: ICD-10-CM

## 2021-02-03 DIAGNOSIS — M20.11 HALLUX VALGUS OF RIGHT FOOT: Primary | ICD-10-CM

## 2021-02-03 DIAGNOSIS — M20.11 HALLUX VALGUS OF RIGHT FOOT: ICD-10-CM

## 2021-02-03 PROCEDURE — 99999 PR PBB SHADOW E&M-EST. PATIENT-LVL IV: CPT | Mod: PBBFAC,,, | Performed by: PODIATRIST

## 2021-02-03 PROCEDURE — 73630 X-RAY EXAM OF FOOT: CPT | Mod: 26,RT,, | Performed by: RADIOLOGY

## 2021-02-03 PROCEDURE — 73630 X-RAY EXAM OF FOOT: CPT | Mod: TC,FY,RT

## 2021-02-03 PROCEDURE — 73630 XR FOOT COMPLETE 3 VIEW RIGHT: ICD-10-PCS | Mod: 26,RT,, | Performed by: RADIOLOGY

## 2021-02-03 PROCEDURE — 29405 APPL SHORT LEG CAST: CPT | Mod: 58,RT,S$GLB, | Performed by: PODIATRIST

## 2021-02-03 PROCEDURE — 99024 PR POST-OP FOLLOW-UP VISIT: ICD-10-PCS | Mod: S$GLB,,, | Performed by: PODIATRIST

## 2021-02-03 PROCEDURE — 99999 PR PBB SHADOW E&M-EST. PATIENT-LVL IV: ICD-10-PCS | Mod: PBBFAC,,, | Performed by: PODIATRIST

## 2021-02-03 PROCEDURE — 99024 POSTOP FOLLOW-UP VISIT: CPT | Mod: S$GLB,,, | Performed by: PODIATRIST

## 2021-02-03 PROCEDURE — 29405 PR APPLY SHORT LEG CAST: ICD-10-PCS | Mod: 58,RT,S$GLB, | Performed by: PODIATRIST

## 2021-02-11 ENCOUNTER — TELEPHONE (OUTPATIENT)
Dept: PODIATRY | Facility: CLINIC | Age: 71
End: 2021-02-11

## 2021-02-12 ENCOUNTER — TELEPHONE (OUTPATIENT)
Dept: PODIATRY | Facility: CLINIC | Age: 71
End: 2021-02-12

## 2021-02-12 ENCOUNTER — OFFICE VISIT (OUTPATIENT)
Dept: PODIATRY | Facility: CLINIC | Age: 71
End: 2021-02-12
Payer: MEDICARE

## 2021-02-12 VITALS
HEIGHT: 63 IN | RESPIRATION RATE: 18 BRPM | OXYGEN SATURATION: 99 % | TEMPERATURE: 98 F | SYSTOLIC BLOOD PRESSURE: 126 MMHG | DIASTOLIC BLOOD PRESSURE: 69 MMHG | BODY MASS INDEX: 31.89 KG/M2 | HEART RATE: 71 BPM | WEIGHT: 180 LBS

## 2021-02-12 DIAGNOSIS — M20.11 HALLUX VALGUS OF RIGHT FOOT: Primary | ICD-10-CM

## 2021-02-12 DIAGNOSIS — S86.011D RUPTURE OF RIGHT ACHILLES TENDON, SUBSEQUENT ENCOUNTER: ICD-10-CM

## 2021-02-12 PROCEDURE — 29405 APPL SHORT LEG CAST: CPT | Mod: 58,RT,S$GLB, | Performed by: PODIATRIST

## 2021-02-12 PROCEDURE — 99999 PR PBB SHADOW E&M-EST. PATIENT-LVL IV: CPT | Mod: PBBFAC,,, | Performed by: PODIATRIST

## 2021-02-12 PROCEDURE — 99999 PR PBB SHADOW E&M-EST. PATIENT-LVL IV: ICD-10-PCS | Mod: PBBFAC,,, | Performed by: PODIATRIST

## 2021-02-12 PROCEDURE — 29405 PR APPLY SHORT LEG CAST: ICD-10-PCS | Mod: 58,RT,S$GLB, | Performed by: PODIATRIST

## 2021-02-12 PROCEDURE — 99024 PR POST-OP FOLLOW-UP VISIT: ICD-10-PCS | Mod: S$GLB,,, | Performed by: PODIATRIST

## 2021-02-12 PROCEDURE — 99024 POSTOP FOLLOW-UP VISIT: CPT | Mod: S$GLB,,, | Performed by: PODIATRIST

## 2021-02-15 ENCOUNTER — TELEPHONE (OUTPATIENT)
Dept: PODIATRY | Facility: CLINIC | Age: 71
End: 2021-02-15

## 2021-03-03 ENCOUNTER — OFFICE VISIT (OUTPATIENT)
Dept: PODIATRY | Facility: CLINIC | Age: 71
End: 2021-03-03
Payer: MEDICARE

## 2021-03-03 ENCOUNTER — HOSPITAL ENCOUNTER (OUTPATIENT)
Dept: RADIOLOGY | Facility: HOSPITAL | Age: 71
Discharge: HOME | End: 2021-03-03
Attending: PODIATRIST
Payer: MEDICARE

## 2021-03-03 DIAGNOSIS — M77.31 CALCANEAL SPUR OF RIGHT FOOT: ICD-10-CM

## 2021-03-03 DIAGNOSIS — M20.11 HALLUX VALGUS OF RIGHT FOOT: ICD-10-CM

## 2021-03-03 DIAGNOSIS — M20.11 HALLUX VALGUS OF RIGHT FOOT: Primary | ICD-10-CM

## 2021-03-03 PROCEDURE — 73630 X-RAY EXAM OF FOOT: CPT | Mod: TC,FY,RT

## 2021-03-03 PROCEDURE — 73630 X-RAY EXAM OF FOOT: CPT | Mod: 26,RT,, | Performed by: RADIOLOGY

## 2021-03-03 PROCEDURE — 99999 PR PBB SHADOW E&M-EST. PATIENT-LVL II: ICD-10-PCS | Mod: PBBFAC,,, | Performed by: PODIATRIST

## 2021-03-03 PROCEDURE — 99024 PR POST-OP FOLLOW-UP VISIT: ICD-10-PCS | Mod: S$GLB,,, | Performed by: PODIATRIST

## 2021-03-03 PROCEDURE — 99024 POSTOP FOLLOW-UP VISIT: CPT | Mod: S$GLB,,, | Performed by: PODIATRIST

## 2021-03-03 PROCEDURE — 73630 XR FOOT COMPLETE 3 VIEW RIGHT: ICD-10-PCS | Mod: 26,RT,, | Performed by: RADIOLOGY

## 2021-03-03 PROCEDURE — 99999 PR PBB SHADOW E&M-EST. PATIENT-LVL II: CPT | Mod: PBBFAC,,, | Performed by: PODIATRIST

## 2021-03-17 ENCOUNTER — OFFICE VISIT (OUTPATIENT)
Dept: PODIATRY | Facility: CLINIC | Age: 71
End: 2021-03-17
Payer: MEDICARE

## 2021-03-17 VITALS
DIASTOLIC BLOOD PRESSURE: 87 MMHG | TEMPERATURE: 97 F | WEIGHT: 180 LBS | SYSTOLIC BLOOD PRESSURE: 147 MMHG | HEART RATE: 74 BPM | HEIGHT: 63 IN | BODY MASS INDEX: 31.89 KG/M2

## 2021-03-17 DIAGNOSIS — S86.011D RUPTURE OF RIGHT ACHILLES TENDON, SUBSEQUENT ENCOUNTER: ICD-10-CM

## 2021-03-17 DIAGNOSIS — M20.11 HALLUX VALGUS OF RIGHT FOOT: Primary | ICD-10-CM

## 2021-03-17 PROCEDURE — 99024 PR POST-OP FOLLOW-UP VISIT: ICD-10-PCS | Mod: S$GLB,,, | Performed by: PODIATRIST

## 2021-03-17 PROCEDURE — 99999 PR PBB SHADOW E&M-EST. PATIENT-LVL III: ICD-10-PCS | Mod: PBBFAC,,, | Performed by: PODIATRIST

## 2021-03-17 PROCEDURE — 99999 PR PBB SHADOW E&M-EST. PATIENT-LVL III: CPT | Mod: PBBFAC,,, | Performed by: PODIATRIST

## 2021-03-17 PROCEDURE — 99024 POSTOP FOLLOW-UP VISIT: CPT | Mod: S$GLB,,, | Performed by: PODIATRIST

## 2021-03-17 RX ORDER — ROPINIROLE 2 MG/1
TABLET, FILM COATED ORAL
COMMUNITY
Start: 2021-03-05

## 2021-03-17 RX ORDER — LEVOTHYROXINE SODIUM 75 UG/1
75 TABLET ORAL DAILY
COMMUNITY
Start: 2021-03-05

## 2021-03-17 RX ORDER — MELOXICAM 15 MG/1
TABLET ORAL
COMMUNITY
Start: 2021-03-05

## 2021-03-17 RX ORDER — MONTELUKAST SODIUM 10 MG/1
TABLET ORAL
COMMUNITY
Start: 2021-03-05

## 2021-03-30 ENCOUNTER — IMMUNIZATION (OUTPATIENT)
Dept: FAMILY MEDICINE | Facility: CLINIC | Age: 71
End: 2021-03-30
Payer: MEDICARE

## 2021-03-30 DIAGNOSIS — Z23 NEED FOR VACCINATION: Primary | ICD-10-CM

## 2021-03-30 PROCEDURE — 91301 COVID-19, MRNA, LNP-S, PF, 100 MCG/0.5 ML DOSE VACCINE: ICD-10-PCS | Mod: S$GLB,,, | Performed by: FAMILY MEDICINE

## 2021-03-30 PROCEDURE — 91301 COVID-19, MRNA, LNP-S, PF, 100 MCG/0.5 ML DOSE VACCINE: CPT | Mod: S$GLB,,, | Performed by: FAMILY MEDICINE

## 2021-03-30 PROCEDURE — 0011A COVID-19, MRNA, LNP-S, PF, 100 MCG/0.5 ML DOSE VACCINE: ICD-10-PCS | Mod: CV19,S$GLB,, | Performed by: FAMILY MEDICINE

## 2021-03-30 PROCEDURE — 0011A COVID-19, MRNA, LNP-S, PF, 100 MCG/0.5 ML DOSE VACCINE: CPT | Mod: CV19,S$GLB,, | Performed by: FAMILY MEDICINE

## 2021-03-31 ENCOUNTER — OFFICE VISIT (OUTPATIENT)
Dept: PODIATRY | Facility: CLINIC | Age: 71
End: 2021-03-31
Payer: MEDICARE

## 2021-03-31 VITALS
HEART RATE: 68 BPM | BODY MASS INDEX: 31.89 KG/M2 | SYSTOLIC BLOOD PRESSURE: 159 MMHG | DIASTOLIC BLOOD PRESSURE: 75 MMHG | TEMPERATURE: 97 F | WEIGHT: 180 LBS | HEIGHT: 63 IN

## 2021-03-31 DIAGNOSIS — S86.011D RUPTURE OF RIGHT ACHILLES TENDON, SUBSEQUENT ENCOUNTER: ICD-10-CM

## 2021-03-31 DIAGNOSIS — M20.11 HALLUX VALGUS OF RIGHT FOOT: Primary | ICD-10-CM

## 2021-03-31 PROCEDURE — 99024 POSTOP FOLLOW-UP VISIT: CPT | Mod: S$GLB,,, | Performed by: PODIATRIST

## 2021-03-31 PROCEDURE — 99024 PR POST-OP FOLLOW-UP VISIT: ICD-10-PCS | Mod: S$GLB,,, | Performed by: PODIATRIST

## 2021-03-31 PROCEDURE — 99999 PR PBB SHADOW E&M-EST. PATIENT-LVL III: ICD-10-PCS | Mod: PBBFAC,,, | Performed by: PODIATRIST

## 2021-03-31 PROCEDURE — 99999 PR PBB SHADOW E&M-EST. PATIENT-LVL III: CPT | Mod: PBBFAC,,, | Performed by: PODIATRIST

## 2021-04-27 ENCOUNTER — IMMUNIZATION (OUTPATIENT)
Dept: FAMILY MEDICINE | Facility: CLINIC | Age: 71
End: 2021-04-27
Payer: MEDICAID

## 2021-04-27 DIAGNOSIS — Z23 NEED FOR VACCINATION: Primary | ICD-10-CM

## 2021-04-27 PROCEDURE — 91301 COVID-19, MRNA, LNP-S, PF, 100 MCG/0.5 ML DOSE VACCINE: ICD-10-PCS | Mod: S$GLB,,, | Performed by: FAMILY MEDICINE

## 2021-04-27 PROCEDURE — 0012A COVID-19, MRNA, LNP-S, PF, 100 MCG/0.5 ML DOSE VACCINE: ICD-10-PCS | Mod: CV19,S$GLB,, | Performed by: FAMILY MEDICINE

## 2021-04-27 PROCEDURE — 0012A COVID-19, MRNA, LNP-S, PF, 100 MCG/0.5 ML DOSE VACCINE: CPT | Mod: CV19,S$GLB,, | Performed by: FAMILY MEDICINE

## 2021-04-27 PROCEDURE — 91301 COVID-19, MRNA, LNP-S, PF, 100 MCG/0.5 ML DOSE VACCINE: CPT | Mod: S$GLB,,, | Performed by: FAMILY MEDICINE

## 2024-01-30 ENCOUNTER — HOSPITAL ENCOUNTER (EMERGENCY)
Facility: HOSPITAL | Age: 74
Discharge: HOME | End: 2024-01-30
Attending: EMERGENCY MEDICINE
Payer: MEDICARE

## 2024-01-30 VITALS
DIASTOLIC BLOOD PRESSURE: 63 MMHG | BODY MASS INDEX: 34.91 KG/M2 | HEIGHT: 63 IN | WEIGHT: 197 LBS | OXYGEN SATURATION: 95 % | TEMPERATURE: 98 F | SYSTOLIC BLOOD PRESSURE: 125 MMHG | RESPIRATION RATE: 20 BRPM | HEART RATE: 80 BPM

## 2024-01-30 DIAGNOSIS — R06.02 SHORTNESS OF BREATH: ICD-10-CM

## 2024-01-30 DIAGNOSIS — J18.9 PNEUMONIA OF RIGHT LOWER LOBE DUE TO INFECTIOUS ORGANISM: Primary | ICD-10-CM

## 2024-01-30 LAB
ALBUMIN SERPL BCP-MCNC: 3 G/DL (ref 3.5–5.2)
ALP SERPL-CCNC: 105 U/L (ref 55–135)
ALT SERPL W/O P-5'-P-CCNC: 19 U/L (ref 10–44)
ANION GAP SERPL CALC-SCNC: 12 MMOL/L (ref 8–16)
AST SERPL-CCNC: 28 U/L (ref 10–40)
BASOPHILS # BLD AUTO: 0.03 K/UL (ref 0–0.2)
BASOPHILS NFR BLD: 0.4 % (ref 0–1.9)
BILIRUB SERPL-MCNC: 1.6 MG/DL (ref 0.1–1)
BNP SERPL-MCNC: 175 PG/ML (ref 0–99)
BUN SERPL-MCNC: 25 MG/DL (ref 8–23)
CALCIUM SERPL-MCNC: 9.3 MG/DL (ref 8.7–10.5)
CHLORIDE SERPL-SCNC: 105 MMOL/L (ref 95–110)
CO2 SERPL-SCNC: 27 MMOL/L (ref 23–29)
CREAT SERPL-MCNC: 1 MG/DL (ref 0.5–1.4)
D DIMER PPP IA.FEU-MCNC: 1.42 MG/L FEU
DIFFERENTIAL METHOD BLD: ABNORMAL
EOSINOPHIL # BLD AUTO: 0.4 K/UL (ref 0–0.5)
EOSINOPHIL NFR BLD: 4.6 % (ref 0–8)
ERYTHROCYTE [DISTWIDTH] IN BLOOD BY AUTOMATED COUNT: 14.9 % (ref 11.5–14.5)
EST. GFR  (NO RACE VARIABLE): 59.9 ML/MIN/1.73 M^2
GLUCOSE SERPL-MCNC: 86 MG/DL (ref 70–110)
HCT VFR BLD AUTO: 35.1 % (ref 37–48.5)
HGB BLD-MCNC: 11.5 G/DL (ref 12–16)
IMM GRANULOCYTES # BLD AUTO: 0.09 K/UL (ref 0–0.04)
IMM GRANULOCYTES NFR BLD AUTO: 1.1 % (ref 0–0.5)
INFLUENZA A, MOLECULAR: NEGATIVE
INFLUENZA B, MOLECULAR: NEGATIVE
LYMPHOCYTES # BLD AUTO: 1.4 K/UL (ref 1–4.8)
LYMPHOCYTES NFR BLD: 16.2 % (ref 18–48)
MAGNESIUM SERPL-MCNC: 1.8 MG/DL (ref 1.6–2.6)
MCH RBC QN AUTO: 29.2 PG (ref 27–31)
MCHC RBC AUTO-ENTMCNC: 32.8 G/DL (ref 32–36)
MCV RBC AUTO: 89 FL (ref 82–98)
MONOCYTES # BLD AUTO: 0.6 K/UL (ref 0.3–1)
MONOCYTES NFR BLD: 6.6 % (ref 4–15)
NEUTROPHILS # BLD AUTO: 5.9 K/UL (ref 1.8–7.7)
NEUTROPHILS NFR BLD: 71.1 % (ref 38–73)
NRBC BLD-RTO: 0 /100 WBC
PLATELET # BLD AUTO: 170 K/UL (ref 150–450)
PMV BLD AUTO: 11 FL (ref 9.2–12.9)
POTASSIUM SERPL-SCNC: 2.9 MMOL/L (ref 3.5–5.1)
PROT SERPL-MCNC: 6.8 G/DL (ref 6–8.4)
RBC # BLD AUTO: 3.94 M/UL (ref 4–5.4)
RSV AG SPEC QL IA: NEGATIVE
SARS-COV-2 RDRP RESP QL NAA+PROBE: NEGATIVE
SODIUM SERPL-SCNC: 144 MMOL/L (ref 136–145)
SPECIMEN SOURCE: NORMAL
SPECIMEN SOURCE: NORMAL
TROPONIN I SERPL DL<=0.01 NG/ML-MCNC: 0.01 NG/ML (ref 0–0.03)
WBC # BLD AUTO: 8.31 K/UL (ref 3.9–12.7)

## 2024-01-30 PROCEDURE — 25000003 PHARM REV CODE 250: Performed by: EMERGENCY MEDICINE

## 2024-01-30 PROCEDURE — 25500020 PHARM REV CODE 255: Performed by: EMERGENCY MEDICINE

## 2024-01-30 PROCEDURE — 83735 ASSAY OF MAGNESIUM: CPT | Performed by: EMERGENCY MEDICINE

## 2024-01-30 PROCEDURE — 71045 X-RAY EXAM CHEST 1 VIEW: CPT | Mod: TC

## 2024-01-30 PROCEDURE — 83880 ASSAY OF NATRIURETIC PEPTIDE: CPT | Performed by: EMERGENCY MEDICINE

## 2024-01-30 PROCEDURE — 71275 CT ANGIOGRAPHY CHEST: CPT | Mod: TC

## 2024-01-30 PROCEDURE — 80053 COMPREHEN METABOLIC PANEL: CPT | Performed by: EMERGENCY MEDICINE

## 2024-01-30 PROCEDURE — 99285 EMERGENCY DEPT VISIT HI MDM: CPT | Mod: 25

## 2024-01-30 PROCEDURE — 85379 FIBRIN DEGRADATION QUANT: CPT | Performed by: EMERGENCY MEDICINE

## 2024-01-30 PROCEDURE — 85025 COMPLETE CBC W/AUTO DIFF WBC: CPT | Performed by: EMERGENCY MEDICINE

## 2024-01-30 PROCEDURE — 87634 RSV DNA/RNA AMP PROBE: CPT | Performed by: EMERGENCY MEDICINE

## 2024-01-30 PROCEDURE — 87502 INFLUENZA DNA AMP PROBE: CPT | Performed by: EMERGENCY MEDICINE

## 2024-01-30 PROCEDURE — 93005 ELECTROCARDIOGRAM TRACING: CPT

## 2024-01-30 PROCEDURE — 86803 HEPATITIS C AB TEST: CPT | Performed by: EMERGENCY MEDICINE

## 2024-01-30 PROCEDURE — 84484 ASSAY OF TROPONIN QUANT: CPT | Performed by: EMERGENCY MEDICINE

## 2024-01-30 PROCEDURE — 93010 ELECTROCARDIOGRAM REPORT: CPT | Mod: ,,, | Performed by: INTERNAL MEDICINE

## 2024-01-30 PROCEDURE — 71045 X-RAY EXAM CHEST 1 VIEW: CPT | Mod: 26,,, | Performed by: RADIOLOGY

## 2024-01-30 PROCEDURE — U0002 COVID-19 LAB TEST NON-CDC: HCPCS | Performed by: EMERGENCY MEDICINE

## 2024-01-30 PROCEDURE — 87389 HIV-1 AG W/HIV-1&-2 AB AG IA: CPT | Performed by: EMERGENCY MEDICINE

## 2024-01-30 PROCEDURE — 71275 CT ANGIOGRAPHY CHEST: CPT | Mod: 26,,, | Performed by: RADIOLOGY

## 2024-01-30 RX ORDER — DOXYCYCLINE HYCLATE 100 MG
100 TABLET ORAL
Status: COMPLETED | OUTPATIENT
Start: 2024-01-30 | End: 2024-01-30

## 2024-01-30 RX ORDER — DOXYCYCLINE 100 MG/1
100 CAPSULE ORAL 2 TIMES DAILY
Qty: 20 CAPSULE | Refills: 0 | Status: SHIPPED | OUTPATIENT
Start: 2024-01-30 | End: 2024-02-09

## 2024-01-30 RX ORDER — POTASSIUM CHLORIDE 20 MEQ/1
40 TABLET, EXTENDED RELEASE ORAL
Status: COMPLETED | OUTPATIENT
Start: 2024-01-30 | End: 2024-01-30

## 2024-01-30 RX ADMIN — IOHEXOL 100 ML: 350 INJECTION, SOLUTION INTRAVENOUS at 07:01

## 2024-01-30 RX ADMIN — DOXYCYCLINE HYCLATE 100 MG: 100 TABLET, COATED ORAL at 08:01

## 2024-01-30 RX ADMIN — POTASSIUM CHLORIDE 40 MEQ: 1500 TABLET, EXTENDED RELEASE ORAL at 06:01

## 2024-01-30 NOTE — ED NOTES
EKG completed. Patient placed on the cardiac monitor. Patient provided with a warm blanket and states no other needs at this time.

## 2024-01-30 NOTE — ED PROVIDER NOTES
"Encounter Date: 1/30/2024       History     Chief Complaint   Patient presents with    Cough     73-year-old female, here from home via private vehicle for evaluation and treatment of cough.  Patient underwent left knee surgery on Friday, 4 days ago.  Today home health came to the home and found the patient coughing.  Patient's orthopedist was notified about the cough, and he told the patient to come here for evaluation.  Patient denies any known fevers or chills.  She denies any history of COPD or CHF.  She has a left knee pain and swelling, likely secondary to the surgery, but there is some concern about possible PE as well.  Patient describes mild shortness of breath only.  Denies chest pain or palpitations.  No nausea vomiting, no diaphoresis.  No dysuria or hematuria, no constipation or loose stools.        Review of patient's allergies indicates:   Allergen Reactions    Vicodin [hydrocodone-acetaminophen]      "causes severe Headaches"     Past Medical History:   Diagnosis Date    Arthritis     Hypertension     HAS NOT HAD PROBLEM OVER SEVERAL YEARS    Hypothyroidism     Restless leg syndrome      Past Surgical History:   Procedure Laterality Date    CHOLECYSTECTOMY      CORRECTION OF HAMMER TOE Right 1/15/2021    Procedure: CORRECTION, HAMMER TOE 2nd;  Surgeon: Yinka Shin DPM;  Location: [facility] OR;  Service: Podiatry;  Laterality: Right;    gastric sleeve      2019    HYSTERECTOMY      REMOVAL OF BONE SPUR OF FOOT Right 1/15/2021    Procedure: EXCISION, BONE SPUR, FOOT;  Surgeon: Yinka Shin DPM;  Location: [facility] OR;  Service: Podiatry;  Laterality: Right;    REPAIR OF ACHILLES TENDON Right 1/15/2021    Procedure: REPAIR, TENDON, ACHILLES arthrex speed bridge;  Surgeon: Yinka Shin DPM;  Location: [facility] OR;  Service: Podiatry;  Laterality: Right;    STOMACH SURGERY      STOMACH SURGERY      SURGICAL REMOVAL OF BUNION WITH OSTEOTOMY OF METATARSAL BONE Right 1/15/2021    Procedure: " BUNIONECTOMY, WITH METATARSAL OSTEOTOMY [place] 28 screws;  Surgeon: Yinka Shin DPM;  Location: [facility] OR;  Service: Podiatry;  Laterality: Right;    TOTAL KNEE REPLACEMENT USING COMPUTER NAVIGATION      right      Family History   Problem Relation Age of Onset    Diabetes Mother     Diabetes Sister     Diabetes Brother     Diabetes Daughter      Social History     Tobacco Use    Smoking status: Former     Types: Cigarettes    Smokeless tobacco: Never   Substance Use Topics    Alcohol use: Not Currently    Drug use: Never     Review of Systems   Constitutional: Negative.  Negative for chills, fatigue and fever.   HENT:  Positive for voice change (Slightly hoarse). Negative for congestion, rhinorrhea, sore throat and trouble swallowing.    Eyes: Negative.    Respiratory: Negative.     Cardiovascular:  Positive for leg swelling (Left leg only).   Gastrointestinal: Negative.    Genitourinary: Negative.    Musculoskeletal: Negative.    Skin: Negative.    Neurological: Negative.    Psychiatric/Behavioral: Negative.         Physical Exam     Initial Vitals [01/30/24 1648]   BP Pulse Resp Temp SpO2   125/63 80 20 97.9 °F (36.6 °C) 95 %      MAP       --         Physical Exam    Nursing note and vitals reviewed.  Constitutional: She appears well-nourished. She is not diaphoretic. No distress.   HENT:   Head: Normocephalic and atraumatic.   Nose: Nose normal.   Mouth/Throat: Oropharynx is clear and moist. No oropharyngeal exudate.   Eyes: Conjunctivae and EOM are normal. Pupils are equal, round, and reactive to light. No scleral icterus.   Neck: Neck supple. No JVD present.   Normal range of motion.  Cardiovascular:  Normal rate, regular rhythm, normal heart sounds and intact distal pulses.           No murmur heard.  Pulmonary/Chest: No stridor. She has wheezes. She has rhonchi.   Slight wheezes and rhonchi bilaterally   Abdominal: Abdomen is soft. Bowel sounds are normal. She exhibits no distension. There is no  abdominal tenderness.   Musculoskeletal:         General: Tenderness and edema present.      Cervical back: Normal range of motion and neck supple.      Comments: Left knee and tissue surrounding the knee are slightly edematous.  No excess warmth or significant erythema.  Compartments are soft in the calf and thigh.  No palpable cords or masses noted.  Distal pulses good, normal motor and sensory.     Neurological: She is alert and oriented to person, place, and time. She has normal strength. No cranial nerve deficit or sensory deficit. GCS score is 15. GCS eye subscore is 4. GCS verbal subscore is 5. GCS motor subscore is 6.   Skin: Skin is warm and dry. Capillary refill takes less than 2 seconds. No rash noted. No erythema.   Psychiatric: She has a normal mood and affect. Her behavior is normal.         ED Course   Procedures  Labs Reviewed   CBC W/ AUTO DIFFERENTIAL - Abnormal; Notable for the following components:       Result Value    RBC 3.94 (*)     Hemoglobin 11.5 (*)     Hematocrit 35.1 (*)     RDW 14.9 (*)     Immature Granulocytes 1.1 (*)     Immature Grans (Abs) 0.09 (*)     Lymph % 16.2 (*)     All other components within normal limits   COMPREHENSIVE METABOLIC PANEL - Abnormal; Notable for the following components:    Potassium 2.9 (*)     BUN 25 (*)     Albumin 3.0 (*)     Total Bilirubin 1.6 (*)     eGFR 59.9 (*)     All other components within normal limits   B-TYPE NATRIURETIC PEPTIDE - Abnormal; Notable for the following components:     (*)     All other components within normal limits   D DIMER, QUANTITATIVE - Abnormal; Notable for the following components:    D-Dimer 1.42 (*)     All other components within normal limits   INFLUENZA A & B BY MOLECULAR   SARS-COV-2 RNA AMPLIFICATION, QUAL    Narrative:     Is the patient symptomatic?->No   TROPONIN I   MAGNESIUM   RSV ANTIGEN DETECTION    Narrative:     Specimen Source->Nasopharyngeal Swab   MAGNESIUM   HIV 1 / 2 ANTIBODY   HEPATITIS C  ANTIBODY     EKG Readings: (Independently Interpreted)   EKG personally reviewed by me shows normal sinus rhythm at 70 beats per minute.  Low voltage, ME interval 146, .  No ST elevation or depression, no arrhythmia       Imaging Results              CTA Chest Non-Coronary (PE Studies) (Final result)  Result time 01/30/24 20:03:55      Final result by Hola Nick MD (01/30/24 20:03:55)                   Impression:      No central or segmental pulmonary embolus.    Mild to moderate bilateral multifocal airspace disease most pronounced in the right lower lobe..  Correlate for infectious/inflammatory process.      Electronically signed by: Hola Nick  Date:    01/30/2024  Time:    20:03               Narrative:    EXAMINATION:  CTA CHEST NON CORONARY (PE STUDIES)    CLINICAL HISTORY:  Pulmonary embolism (PE) suspected, positive D-dimer;    TECHNIQUE:  Low dose axial images, sagittal and coronal reformations were obtained from the thoracic inlet to the lung bases following the IV administration of 100 mL of Omnipaque 350.  Contrast timing was optimized to evaluate the pulmonary arteries.  MIP images were performed.    COMPARISON:  Same day chest radiograph    FINDINGS:  No central or segmental pulmonary embolus.    Severe coronary artery calcification.  No cardiomegaly or pericardial effusion.  Normal caliber aorta.    Moderate patchy airspace disease throughout the right lower lobe and otherwise mild bilateral multifocal airspace disease.  No pleural effusion or pneumothorax.    Multilevel degenerative changes of the spine with diffuse idiopathic skeletal hyperostosis.                                       X-Ray Chest 1 View (Final result)  Result time 01/30/24 18:00:24      Final result by Hola Nick MD (01/30/24 18:00:24)                   Impression:      As above.      Electronically signed by: Hola Nick  Date:    01/30/2024  Time:    18:00               Narrative:     EXAMINATION:  XR CHEST 1 VIEW    CLINICAL HISTORY:  shortness of breath;    TECHNIQUE:  Single frontal view of the chest was performed.    COMPARISON:  None    FINDINGS:  Mild bibasilar airspace disease and/or atelectasis, right greater than left.  Normal heart size.                                    X-Rays:   Independently Interpreted Readings:   Other Readings:  Chest x-ray personally reviewed by me shows no evidence of lobar consolidations, no effusions.  Mild airspace disease, right greater than left, normal cardiac silhouette, normal skeletal structures    Medications   potassium chloride SA CR tablet 40 mEq (40 mEq Oral Given 1/30/24 1857)   iohexoL (OMNIPAQUE 350) injection 100 mL (100 mLs Intravenous Given 1/30/24 1915)   doxycycline tablet 100 mg (100 mg Oral Given 1/30/24 2019)     Medical Decision Making  Patient sent for evaluation of a cough with mild shortness of breath.  She is status post left knee surgery about 5 days ago.  Differential includes viral illness, pneumonia, COVID-19, influenza, pulmonary embolus, etc..  Thus far, labs are unremarkable.  Chest x-ray shows no evidence of pneumonia.  My suspicion for PE is low.  At shift change, care be transferred to Dr. Hammond who will review labs and make disposition    Patient informed of the possible early right lower lobe pneumonia.  Given her age in her elevated BUN her curb 65 score is 2 which puts her in the intermediate zone.  I discussed with patient that based on her risk stratification she could not be appropriate for a short observation stay in the emergency department for monitoring.  Patient states that she really just would like to go home and get a good night sleep of rest.  Patient will be given p.o. doxycycline here and a prescription for more.    Amount and/or Complexity of Data Reviewed  Labs: ordered. Decision-making details documented in ED Course.  Radiology: ordered. Decision-making details documented in ED  Course.    Risk  Prescription drug management.                                      Clinical Impression:  Final diagnoses:  [R06.02] Shortness of breath  [J18.9] Pneumonia of right lower lobe due to infectious organism - early (Primary)          ED Disposition Condition    Discharge Stable          ED Prescriptions       Medication Sig Dispense Start Date End Date Auth. Provider    doxycycline (VIBRAMYCIN) 100 MG Cap Take 1 capsule (100 mg total) by mouth 2 (two) times daily. for 10 days 20 capsule 1/30/2024 2/9/2024 Mony Hammond MD          Follow-up Information       Follow up With Specialties Details Why Contact Info    Marcelina Weber, P Family Medicine   [address] [facility] [place] [address] [phone]               Mony Hammond MD  01/31/24 8672

## 2024-01-31 LAB
HCV AB SERPL QL IA: NORMAL
HIV 1+2 AB+HIV1 P24 AG SERPL QL IA: NORMAL

## 2024-01-31 NOTE — DISCHARGE INSTRUCTIONS
It Is important that you are seen by your primary care provider within the next 1-2 days for recheck.  Return to the emergency department for any new or worsening symptoms.

## 2024-07-10 ENCOUNTER — TELEPHONE (OUTPATIENT)
Dept: UROGYNECOLOGY | Facility: CLINIC | Age: 74
End: 2024-07-10
Payer: MEDICARE

## 2024-08-28 ENCOUNTER — TELEPHONE (OUTPATIENT)
Dept: UROGYNECOLOGY | Facility: CLINIC | Age: 74
End: 2024-08-28
Payer: MEDICARE

## 2024-08-28 NOTE — TELEPHONE ENCOUNTER
Tried to schedule appt for pt, but pt is out-of-network and will call insurance to find an in-network provider. Pt verbalized understanding, call ended.      ----- Message from Leeroy Brown sent at 8/28/2024  3:34 PM CDT -----  Name of Who is Calling:EMELYN BENJAMIN [[id number]]                What is the request in detail:Pt calling to schedule an apt with you for a falling bladder, I tried to schedule her an apt but no time slots came up for me and tried with all the UROGYN providers.Please call back to further assist.                 Can the clinic reply by [facility]: No                What Number to Call Back if not in [facility]: [phone]

## 2025-03-02 ENCOUNTER — HOSPITAL ENCOUNTER (EMERGENCY)
Facility: HOSPITAL | Age: 75
Discharge: HOME | End: 2025-03-02
Attending: EMERGENCY MEDICINE
Payer: MEDICARE

## 2025-03-02 VITALS
DIASTOLIC BLOOD PRESSURE: 61 MMHG | TEMPERATURE: 98 F | HEIGHT: 62 IN | RESPIRATION RATE: 15 BRPM | BODY MASS INDEX: 39.36 KG/M2 | WEIGHT: 213.88 LBS | OXYGEN SATURATION: 97 % | HEART RATE: 65 BPM | SYSTOLIC BLOOD PRESSURE: 131 MMHG

## 2025-03-02 DIAGNOSIS — S09.90XA CLOSED HEAD INJURY, INITIAL ENCOUNTER: Primary | ICD-10-CM

## 2025-03-02 PROCEDURE — 99284 EMERGENCY DEPT VISIT MOD MDM: CPT | Mod: 25

## 2025-03-02 PROCEDURE — 70450 CT HEAD/BRAIN W/O DYE: CPT | Mod: TC

## 2025-03-02 NOTE — DISCHARGE INSTRUCTIONS
Continue home medications.  You may take ibuprofen as needed for pain.  But ice tear forehead and it intermittently for the next couple of days to decrease swelling.  Follow up with your primary care provider.

## 2025-03-02 NOTE — ED PROVIDER NOTES
"Encounter Date: 3/2/2025       History     Chief Complaint   Patient presents with    Fall     Pt reports tripping over a parking curb approximately 1930 yesterday. Pt reports hitting her right forehead and abrasion to her right knee. Pt denies LOC. Contusion noted to right forehead and and abrasion to right knee; bleeding controlled. Pt reports mild nausea and vomiting x2 episodes. Pt is not on blood thinner.     Patient presents to the emergency department for evaluation of head injury.  Patient states she fell this evening and struck her head.  She states she lost her balance but did not pass out.  She is unsure if she had any loss of consciousness.  She has had nausea and vomiting and does complain of the headache.  She denies any visual changes.  She states she took some ibuprofen at home but it has not helped him much.  Impact was in the right frontal region of her head.  She denies any numbness or tingling.  She denies any other complaints.    The history is provided by the patient.     Review of patient's allergies indicates:   Allergen Reactions    Hydrocodone-acetaminophen      "causes severe Headaches"    Other Reaction(s): headache     Past Medical History:   Diagnosis Date    Arthritis     Hypertension     HAS NOT HAD PROBLEM OVER SEVERAL YEARS    Hypothyroidism     Restless leg syndrome      Past Surgical History:   Procedure Laterality Date    CHOLECYSTECTOMY      CORRECTION OF HAMMER TOE Right 1/15/2021    Procedure: CORRECTION, HAMMER TOE 2nd;  Surgeon: Yinka Shin DPM;  Location: [facility] OR;  Service: Podiatry;  Laterality: Right;    gastric sleeve      2019    HYSTERECTOMY      REMOVAL OF BONE SPUR OF FOOT Right 1/15/2021    Procedure: EXCISION, BONE SPUR, FOOT;  Surgeon: Yinka Shin DPM;  Location: [facility] OR;  Service: Podiatry;  Laterality: Right;    REPAIR OF ACHILLES TENDON Right 1/15/2021    Procedure: REPAIR, TENDON, ACHILLES arthrex speed bridge;  Surgeon: Yinka Shin, " MIGUELITO;  Location: [facility] OR;  Service: Podiatry;  Laterality: Right;    STOMACH SURGERY      STOMACH SURGERY      SURGICAL REMOVAL OF BUNION WITH OSTEOTOMY OF METATARSAL BONE Right 1/15/2021    Procedure: BUNIONECTOMY, WITH METATARSAL OSTEOTOMY [place] 28 screws;  Surgeon: Yinka Shin DPM;  Location: [facility] OR;  Service: Podiatry;  Laterality: Right;    TOTAL KNEE REPLACEMENT USING COMPUTER NAVIGATION      right      Family History   Problem Relation Name Age of Onset    Diabetes Mother      Diabetes Sister      Diabetes Brother      Diabetes Daughter       Social History[1]  Review of Systems   Constitutional:  Negative for activity change, appetite change, chills and fever.   HENT:  Negative for congestion, ear discharge, ear pain, nosebleeds, sore throat and voice change.    Eyes:  Negative for photophobia, pain and discharge.   Respiratory:  Negative for cough, chest tightness, shortness of breath and wheezing.    Cardiovascular:  Negative for chest pain and palpitations.   Gastrointestinal:  Positive for nausea and vomiting. Negative for abdominal pain and constipation.   Genitourinary:  Negative for dysuria, flank pain, frequency and urgency.   Musculoskeletal:  Negative for back pain and neck pain.   Skin:  Negative for rash and wound.   Neurological:  Positive for headaches. Negative for dizziness, seizures and weakness.   All other systems reviewed and are negative.      Physical Exam     Initial Vitals [03/02/25 0242]   BP Pulse Resp Temp SpO2   128/62 65 15 97.5 °F (36.4 °C) 97 %      MAP       --         Physical Exam    Nursing note and vitals reviewed.  Constitutional: She appears well-developed and well-nourished.   HENT:   Head: Normocephalic and atraumatic.   Right Ear: External ear normal.   Left Ear: External ear normal.   Nose: Nose normal. Mouth/Throat: Oropharynx is clear and moist.   Eyes: Conjunctivae and EOM are normal. Pupils are equal, round, and reactive to light.   Neck: Neck supple.  No tracheal deviation present.   Normal range of motion.  Cardiovascular:  Normal rate, regular rhythm and normal heart sounds.           Pulmonary/Chest: Breath sounds normal. She has no wheezes.   Abdominal: Abdomen is soft. Bowel sounds are normal. There is no abdominal tenderness.   Musculoskeletal:         General: No tenderness. Normal range of motion.      Cervical back: Normal range of motion and neck supple.     Neurological: She is alert and oriented to person, place, and time. She has normal reflexes.   Skin: Skin is warm and dry. Capillary refill takes less than 2 seconds. No rash noted.   There is contusion over the left eye.  There is no step-off or depression noted.         ED Course   Procedures  Labs Reviewed - No data to display       Imaging Results              CT Head Without Contrast (Final result)  Result time 03/02/25 03:31:29      Final result by James Sears MD (03/02/25 03:31:29)                   Impression:      No CT evidence of acute intracranial abnormality.    Generalized cerebral volume loss and chronic ischemic changes.    Right mastoid effusion.      Electronically signed by: James Sears MD  Date:    03/02/2025  Time:    03:31               Narrative:    EXAMINATION:  CT HEAD WITHOUT CONTRAST    CLINICAL HISTORY:  Head trauma, moderate-severe;    TECHNIQUE:  Low dose axial images were obtained through the head.  Coronal and sagittal reformations were also performed. Contrast was not administered.    COMPARISON:  None.    FINDINGS:  Mild generalized cerebral volume loss.  Patchy and confluent periventricular white matter hypoattenuation suggestive of chronic microvascular ischemic change, though nonspecific.    No evidence of acute territorial infarct, hemorrhage, mass effect, or midline shift.    Ventricles are normal in size and configuration.    No displaced calvarial fracture.    Minimal mucosal thickening in the paranasal sinuses.  No air-fluid levels.  Partial  opacification of the right mastoid air cells.                                       Medications - No data to display  Medical Decision Making  History is obtained from the patient.  All x-rays are reviewed by myself.  Differential diagnosis includes, but is not limited to, scalp contusion/intracranial pathology/skull fracture  Social determinants of healthcare were considered.  Patient has commercial insurance and a primary care provider and no decreased access to healthcare.    CT of the head reveals no acute intracranial pathology.  We will discharge patient home to follow up with her primary care provider.    Amount and/or Complexity of Data Reviewed  Radiology: ordered.                                      Clinical Impression:  Final diagnoses:  [S09.90XA] Closed head injury, initial encounter (Primary)          ED Disposition Condition    Discharge Stable          ED Prescriptions    None       Follow-up Information       Follow up With Specialties Details Why Contact Info    Marcelina Weber, FNP Family Medicine Schedule an appointment as soon as possible for a visit   [address] [place] [id number]  [phone]                   [1]   Social History  Tobacco Use    Smoking status: Former     Types: Cigarettes    Smokeless tobacco: Never   Substance Use Topics    Alcohol use: Not Currently    Drug use: Never        Joe Sheehan DO  03/02/25 0406

## 2025-03-05 ENCOUNTER — HOSPITAL ENCOUNTER (EMERGENCY)
Facility: HOSPITAL | Age: 75
Discharge: HOME | End: 2025-03-05
Attending: EMERGENCY MEDICINE
Payer: MEDICARE

## 2025-03-05 VITALS
HEIGHT: 62 IN | BODY MASS INDEX: 39.2 KG/M2 | HEART RATE: 80 BPM | SYSTOLIC BLOOD PRESSURE: 134 MMHG | TEMPERATURE: 98 F | RESPIRATION RATE: 20 BRPM | WEIGHT: 213 LBS | DIASTOLIC BLOOD PRESSURE: 69 MMHG | OXYGEN SATURATION: 96 %

## 2025-03-05 DIAGNOSIS — F32.A DEPRESSION, UNSPECIFIED DEPRESSION TYPE: Primary | ICD-10-CM

## 2025-03-05 PROCEDURE — 99281 EMR DPT VST MAYX REQ PHY/QHP: CPT
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
## (undated) DEVICE — KIT SURGI-START 7695-SLA CUSTM

## (undated) DEVICE — RASP LARGE TEAR 12.7X7MM

## (undated) DEVICE — KWIRE SMOOTH TRCR TIP .9X150MM
Type: IMPLANTABLE DEVICE | Site: FOOT | Status: NON-FUNCTIONAL
Removed: 2021-01-15

## (undated) DEVICE — BLADE SAW MED NAR 18.0X5.5MM

## (undated) DEVICE — SEE MEDLINE ITEM 146298

## (undated) DEVICE — GLOVE PI ULTRA TOUCH G SURGEON

## (undated) DEVICE — GLOVE BIOGEL PI ORTHO PRO SZ 8

## (undated) DEVICE — GLOVE BIOGEL PI ORTHO PRO 7.5

## (undated) DEVICE — SUT MONOCRYL 4-0 PS-2

## (undated) DEVICE — COVER LIGHT HANDLE 80/CA

## (undated) DEVICE — NDL 26.5 TAPR CRV XLOOP

## (undated) DEVICE — GOWN SURGICAL XX LARGE X LONG

## (undated) DEVICE — UNDERGLOVES BIOGEL PI SIZE 8

## (undated) DEVICE — SUT 4-0 VICRYL / SH

## (undated) DEVICE — NDL SAFETY 25G X 1.5 ECLIPSE

## (undated) DEVICE — PAD ABD 8X10 STERILE

## (undated) DEVICE — DRESSING TRANS 4X4 3/4

## (undated) DEVICE — GAUZE SPONGE 4X4 12PLY

## (undated) DEVICE — CLOSURE SKIN STERI STRIP 1/2X4

## (undated) DEVICE — DRESSING N ADH OIL EMUL 3X8 3S

## (undated) DEVICE — SUT 4-0 ETHILON 18 PS-2

## (undated) DEVICE — SEE MEDLINE ITEM 157116

## (undated) DEVICE — ELECTRODE PENCIL W/ROCKER NDL

## (undated) DEVICE — SPONGE DERMACEA GAUZE 4X4

## (undated) DEVICE — APPLICATOR CHLORAPREP ORN 26ML

## (undated) DEVICE — BLADE AGGR LRG TOOTH MED 31X9

## (undated) DEVICE — GOWN B1 X-LG X-LONG

## (undated) DEVICE — ELECTRODE REM PLYHSV RETURN 9

## (undated) DEVICE — UNDERGLOVE BIOGEL PI SZ 6.5 LF

## (undated) DEVICE — DRAPE MINI C-ARM

## (undated) DEVICE — GLOVE SURG ULTRA TOUCH 9

## (undated) DEVICE — TOURNIQUET SB QC SP 18X4IN

## (undated) DEVICE — GLOVE SURGEONS ULTRA TOUCH 6.5

## (undated) DEVICE — SUT 3-0 VICRYL / SH (J416)

## (undated) DEVICE — SYR B-D DISP CONTROL 10CC100/C

## (undated) DEVICE — SEE MEDLINE ITEM 88971

## (undated) DEVICE — BLADE SURG #15 CARBON STEEL

## (undated) DEVICE — SEE MEDLINE ITEM 152522